# Patient Record
Sex: FEMALE | URBAN - METROPOLITAN AREA
[De-identification: names, ages, dates, MRNs, and addresses within clinical notes are randomized per-mention and may not be internally consistent; named-entity substitution may affect disease eponyms.]

---

## 2018-09-06 NOTE — PATIENT DISCUSSION
VA today worse BCVA OU vs last year but no apparent reason.  re-check another day to see if better or same.  my suspicion is this is a one-off finding. ... Olga Sauceda

## 2018-09-25 NOTE — PATIENT DISCUSSION
VA today worse BCVA OU vs last year but no apparent reason.  re-check another day to see if better or same.  my suspicion is this is a one-off finding. ... Dacia Mann

## 2019-09-10 NOTE — PATIENT DISCUSSION
VA today worse BCVA OU vs last year but no apparent reason.  re-check another day to see if better or same.  my suspicion is this is a one-off finding. ... Dany Pineda

## 2019-11-04 ENCOUNTER — IMPORTED ENCOUNTER (OUTPATIENT)
Dept: URBAN - METROPOLITAN AREA CLINIC 1 | Facility: CLINIC | Age: 64
End: 2019-11-04

## 2019-11-04 PROBLEM — H01.001: Noted: 2019-11-04

## 2019-11-04 PROBLEM — H26.491: Noted: 2019-11-04

## 2019-11-04 PROBLEM — H04.123: Noted: 2019-11-04

## 2019-11-04 PROBLEM — Z96.1: Noted: 2019-11-04

## 2019-11-04 PROBLEM — H01.004: Noted: 2019-11-04

## 2019-11-04 PROBLEM — H01.002: Noted: 2019-11-04

## 2019-11-04 PROBLEM — H01.005: Noted: 2019-11-04

## 2019-11-04 PROBLEM — H25.812: Noted: 2019-11-04

## 2019-11-04 PROCEDURE — 92004 COMPRE OPH EXAM NEW PT 1/>: CPT

## 2019-11-04 PROCEDURE — 92015 DETERMINE REFRACTIVE STATE: CPT

## 2019-12-02 ENCOUNTER — IMPORTED ENCOUNTER (OUTPATIENT)
Dept: URBAN - METROPOLITAN AREA CLINIC 1 | Facility: CLINIC | Age: 64
End: 2019-12-02

## 2019-12-02 PROBLEM — H26.491: Noted: 2019-12-02

## 2019-12-02 PROBLEM — H01.004: Noted: 2019-12-02

## 2019-12-02 PROBLEM — H01.001: Noted: 2019-12-02

## 2019-12-02 PROBLEM — H25.812: Noted: 2019-12-02

## 2019-12-02 PROBLEM — H04.123: Noted: 2019-12-02

## 2019-12-02 PROBLEM — Z96.1: Noted: 2019-12-02

## 2019-12-02 PROCEDURE — 92136 OPHTHALMIC BIOMETRY: CPT

## 2019-12-02 PROCEDURE — 92012 INTRM OPH EXAM EST PATIENT: CPT

## 2019-12-11 ENCOUNTER — IMPORTED ENCOUNTER (OUTPATIENT)
Dept: URBAN - METROPOLITAN AREA CLINIC 1 | Facility: CLINIC | Age: 64
End: 2019-12-11

## 2019-12-12 ENCOUNTER — IMPORTED ENCOUNTER (OUTPATIENT)
Dept: URBAN - METROPOLITAN AREA CLINIC 1 | Facility: CLINIC | Age: 64
End: 2019-12-12

## 2019-12-12 PROBLEM — Z96.1: Noted: 2019-12-12

## 2019-12-12 PROCEDURE — 99024 POSTOP FOLLOW-UP VISIT: CPT

## 2020-01-06 ENCOUNTER — IMPORTED ENCOUNTER (OUTPATIENT)
Dept: URBAN - METROPOLITAN AREA CLINIC 1 | Facility: CLINIC | Age: 65
End: 2020-01-06

## 2020-01-06 PROBLEM — Z96.1: Noted: 2020-01-06

## 2020-01-06 PROCEDURE — 99024 POSTOP FOLLOW-UP VISIT: CPT

## 2020-01-24 ENCOUNTER — IMPORTED ENCOUNTER (OUTPATIENT)
Dept: URBAN - METROPOLITAN AREA CLINIC 1 | Facility: CLINIC | Age: 65
End: 2020-01-24

## 2020-01-24 PROBLEM — H43.812: Noted: 2020-01-24

## 2020-01-24 PROCEDURE — 92012 INTRM OPH EXAM EST PATIENT: CPT

## 2020-10-05 ENCOUNTER — IMPORTED ENCOUNTER (OUTPATIENT)
Dept: URBAN - METROPOLITAN AREA CLINIC 1 | Facility: CLINIC | Age: 65
End: 2020-10-05

## 2020-10-05 PROBLEM — H26.493: Noted: 2020-10-05

## 2020-10-05 PROBLEM — H01.004: Noted: 2020-10-05

## 2020-10-05 PROBLEM — H35.3131: Noted: 2020-10-05

## 2020-10-05 PROBLEM — H01.001: Noted: 2020-10-05

## 2020-10-05 PROBLEM — H43.812: Noted: 2020-10-05

## 2020-10-05 PROBLEM — Z96.1: Noted: 2020-10-05

## 2020-10-05 PROBLEM — H04.123: Noted: 2020-10-05

## 2020-10-05 PROCEDURE — 92015 DETERMINE REFRACTIVE STATE: CPT

## 2020-10-05 PROCEDURE — 92014 COMPRE OPH EXAM EST PT 1/>: CPT

## 2020-11-06 ENCOUNTER — IMPORTED ENCOUNTER (OUTPATIENT)
Dept: URBAN - METROPOLITAN AREA CLINIC 1 | Facility: CLINIC | Age: 65
End: 2020-11-06

## 2020-11-06 PROBLEM — H26.491: Noted: 2020-11-06

## 2020-11-06 PROCEDURE — 66821 AFTER CATARACT LASER SURGERY: CPT

## 2021-01-15 ENCOUNTER — IMPORTED ENCOUNTER (OUTPATIENT)
Dept: URBAN - METROPOLITAN AREA CLINIC 1 | Facility: CLINIC | Age: 66
End: 2021-01-15

## 2021-01-15 PROBLEM — H26.492: Noted: 2021-01-15

## 2021-01-15 PROCEDURE — 66821 AFTER CATARACT LASER SURGERY: CPT

## 2021-02-15 ENCOUNTER — IMPORTED ENCOUNTER (OUTPATIENT)
Dept: URBAN - METROPOLITAN AREA CLINIC 1 | Facility: CLINIC | Age: 66
End: 2021-02-15

## 2021-02-15 PROCEDURE — 99024 POSTOP FOLLOW-UP VISIT: CPT

## 2021-12-01 ENCOUNTER — IMPORTED ENCOUNTER (OUTPATIENT)
Dept: URBAN - METROPOLITAN AREA CLINIC 1 | Facility: CLINIC | Age: 66
End: 2021-12-01

## 2021-12-01 PROBLEM — H35.3131: Noted: 2021-12-01

## 2021-12-01 PROBLEM — Z96.1: Noted: 2021-12-01

## 2021-12-01 PROBLEM — H04.123: Noted: 2021-12-01

## 2021-12-01 PROBLEM — H43.812: Noted: 2021-12-01

## 2021-12-01 PROBLEM — H01.001: Noted: 2021-12-01

## 2021-12-01 PROBLEM — H01.004: Noted: 2021-12-01

## 2021-12-01 PROCEDURE — 92015 DETERMINE REFRACTIVE STATE: CPT

## 2021-12-01 PROCEDURE — 99214 OFFICE O/P EST MOD 30 MIN: CPT

## 2022-04-09 ASSESSMENT — VISUAL ACUITY
OS_GLARE: 20/40
OD_GLARE: 20/40
OS_GLARE: 20/60
OS_CC: 20/25+2
OS_CC: 20/25
OS_SC: 20/20-2
OD_SC: 20/25
OD_GLARE: 20/40
OD_CC: 20/25
OS_CC: 20/60
OD_CC: 20/20-2
OS_PH: SC 20/30 +1
OS_SC: 20/20-1
OD_CC: 20/20-2
OS_CC: 20/25-1
OS_CC: 20/25-2
OD_CC: 20/20-1
OS_CC: 20/30-1
OD_SC: 20/20
OD_CC: 20/20-1

## 2022-04-09 ASSESSMENT — TONOMETRY
OD_IOP_MMHG: 15
OS_IOP_MMHG: 17
OD_IOP_MMHG: 17
OD_IOP_MMHG: 17
OS_IOP_MMHG: 15
OD_IOP_MMHG: 17
OS_IOP_MMHG: 16
OS_IOP_MMHG: 18
OD_IOP_MMHG: 16
OS_IOP_MMHG: 16
OS_IOP_MMHG: 18
OD_IOP_MMHG: 17
OS_IOP_MMHG: 17

## 2023-01-30 ENCOUNTER — COMPREHENSIVE EXAM (OUTPATIENT)
Dept: URBAN - METROPOLITAN AREA CLINIC 1 | Facility: CLINIC | Age: 68
End: 2023-01-30

## 2023-01-30 DIAGNOSIS — H35.3131: ICD-10-CM

## 2023-01-30 DIAGNOSIS — Z96.1: ICD-10-CM

## 2023-01-30 DIAGNOSIS — H43.812: ICD-10-CM

## 2023-01-30 DIAGNOSIS — H01.024: ICD-10-CM

## 2023-01-30 DIAGNOSIS — H04.123: ICD-10-CM

## 2023-01-30 DIAGNOSIS — H01.021: ICD-10-CM

## 2023-01-30 DIAGNOSIS — Z98.890: ICD-10-CM

## 2023-01-30 PROCEDURE — 99214 OFFICE O/P EST MOD 30 MIN: CPT

## 2023-01-30 PROCEDURE — 92015 DETERMINE REFRACTIVE STATE: CPT

## 2023-01-30 ASSESSMENT — TONOMETRY
OS_IOP_MMHG: 12
OD_IOP_MMHG: 12

## 2023-01-30 ASSESSMENT — VISUAL ACUITY
OS_SC: 20/40
OS_BAT: 20/30
OD_BAT: 20/30
OD_SC: 20/40

## 2023-11-06 ENCOUNTER — FOLLOW UP (OUTPATIENT)
Dept: URBAN - METROPOLITAN AREA CLINIC 1 | Facility: CLINIC | Age: 68
End: 2023-11-06

## 2023-11-06 DIAGNOSIS — G43.B0: ICD-10-CM

## 2023-11-06 DIAGNOSIS — H35.3131: ICD-10-CM

## 2023-11-06 PROCEDURE — 92012 INTRM OPH EXAM EST PATIENT: CPT

## 2023-11-06 ASSESSMENT — VISUAL ACUITY
OS_SC: 20/40
OD_SC: 20/30

## 2023-11-06 ASSESSMENT — TONOMETRY
OS_IOP_MMHG: 12
OD_IOP_MMHG: 12